# Patient Record
Sex: MALE | Race: WHITE | NOT HISPANIC OR LATINO | Employment: UNEMPLOYED | URBAN - METROPOLITAN AREA
[De-identification: names, ages, dates, MRNs, and addresses within clinical notes are randomized per-mention and may not be internally consistent; named-entity substitution may affect disease eponyms.]

---

## 2023-11-06 ENCOUNTER — OFFICE VISIT (OUTPATIENT)
Dept: PRIMARY CARE | Facility: CLINIC | Age: 38
End: 2023-11-06
Payer: COMMERCIAL

## 2023-11-06 VITALS
WEIGHT: 201 LBS | DIASTOLIC BLOOD PRESSURE: 80 MMHG | BODY MASS INDEX: 26.52 KG/M2 | TEMPERATURE: 98.2 F | HEART RATE: 86 BPM | SYSTOLIC BLOOD PRESSURE: 126 MMHG | OXYGEN SATURATION: 98 %

## 2023-11-06 DIAGNOSIS — J02.9 SORE THROAT: Primary | ICD-10-CM

## 2023-11-06 LAB — POC RAPID STREP: NEGATIVE

## 2023-11-06 PROCEDURE — 87880 STREP A ASSAY W/OPTIC: CPT | Performed by: NURSE PRACTITIONER

## 2023-11-06 PROCEDURE — 87081 CULTURE SCREEN ONLY: CPT

## 2023-11-06 PROCEDURE — 99213 OFFICE O/P EST LOW 20 MIN: CPT | Performed by: NURSE PRACTITIONER

## 2023-11-06 PROCEDURE — 1036F TOBACCO NON-USER: CPT | Performed by: NURSE PRACTITIONER

## 2023-11-06 RX ORDER — HYDROCODONE BITARTRATE AND ACETAMINOPHEN 5; 325 MG/1; MG/1
1 TABLET ORAL EVERY 4 HOURS PRN
COMMUNITY

## 2023-11-06 RX ORDER — GABAPENTIN 100 MG/1
100 CAPSULE ORAL 3 TIMES DAILY
COMMUNITY
Start: 2023-11-02 | End: 2023-12-02

## 2023-11-06 ASSESSMENT — PAIN SCALES - GENERAL: PAINLEVEL: 0-NO PAIN

## 2023-11-06 NOTE — PROGRESS NOTES
Subjective   Patient ID: Humphrey Ding is a 38 y.o. male who presents for Sore Throat (Left side worse. X2-3 days. Possible tonsil stone per patient.).    HPI   Pt presents with sore throat and possible tonsils stones that began  3 days ago  No painful or difficult swallow, drooling  No fever, chills,   No gi complaints  No otc used    Review of Systems  ROS negative with exceptions above    Objective   /80   Pulse 86   Temp 36.8 °C (98.2 °F)   Wt 91.2 kg (201 lb)   SpO2 98%   BMI 26.52 kg/m²     Physical Exam  A/O x3 NAD  Bilat TM dull. Nares Red and patent. +3 erythemic tonsils with white exudate left.  Posterior pharynx erythema noted.   Neck supple with bilateral AC lymphadenopathy  Heart with RRR  Lungs CTA bilaterally  Skin warm and dry  Neuro grossly intact     Assessment/Plan   Diagnoses and all orders for this visit:  Sore throat  -     POCT rapid strep A manually resulted  -     Group A Streptococcus, Culture  -     amoxicillin-pot clavulanate (Augmentin) 875-125 mg tablet; Take 1 tablet (875 mg) by mouth 2 times a day for 10 days.  Strep negative  Will treat with antibiotic for tonsillitis  Fluids  Rest  Salt water gargles  Humidifier  Follow up 7-10 days INB

## 2023-11-07 ENCOUNTER — TELEPHONE (OUTPATIENT)
Dept: PRIMARY CARE | Facility: CLINIC | Age: 38
End: 2023-11-07
Payer: COMMERCIAL

## 2023-11-07 RX ORDER — AMOXICILLIN AND CLAVULANATE POTASSIUM 875; 125 MG/1; MG/1
875 TABLET, FILM COATED ORAL 2 TIMES DAILY
Qty: 20 TABLET | Refills: 0 | Status: SHIPPED | OUTPATIENT
Start: 2023-11-07 | End: 2023-11-17

## 2023-11-07 NOTE — TELEPHONE ENCOUNTER
Pt says he needs antimitotic sent to pharmacy. Discussed in appt on 11/06  Pharmacy- [facility], [address]

## 2023-11-09 LAB — S PYO THROAT QL CULT: NORMAL

## 2023-11-15 ENCOUNTER — OFFICE VISIT (OUTPATIENT)
Dept: PRIMARY CARE | Facility: CLINIC | Age: 38
End: 2023-11-15
Payer: COMMERCIAL

## 2023-11-15 VITALS
SYSTOLIC BLOOD PRESSURE: 122 MMHG | HEART RATE: 68 BPM | OXYGEN SATURATION: 98 % | WEIGHT: 197 LBS | BODY MASS INDEX: 25.99 KG/M2 | TEMPERATURE: 97.8 F | DIASTOLIC BLOOD PRESSURE: 82 MMHG

## 2023-11-15 DIAGNOSIS — J03.90 TONSILLITIS: Primary | ICD-10-CM

## 2023-11-15 PROCEDURE — 1036F TOBACCO NON-USER: CPT | Performed by: NURSE PRACTITIONER

## 2023-11-15 PROCEDURE — 99213 OFFICE O/P EST LOW 20 MIN: CPT | Performed by: NURSE PRACTITIONER

## 2023-11-15 RX ORDER — AZITHROMYCIN 250 MG/1
TABLET, FILM COATED ORAL
Qty: 6 TABLET | Refills: 0 | Status: SHIPPED | OUTPATIENT
Start: 2023-11-15 | End: 2023-11-20

## 2023-11-15 ASSESSMENT — PAIN SCALES - GENERAL: PAINLEVEL: 0-NO PAIN

## 2023-11-15 NOTE — PATIENT INSTRUCTIONS
Please complete the previously prescribed antibiotic and add the Z-Ford    Salt water gargles  Stay hydrated  Monitor your symptoms and if not improving, we will send you to see ENT    To emergency department if unable to swallow, you are drooling, or feel swelling in your throat.

## 2023-11-16 NOTE — PROGRESS NOTES
Subjective   Patient ID: Humphrey Ding is a 38 y.o. male who presents for Sore Throat (Something was on uvula, fell off this morning. ).    HPI   Pt was recently seen for tonsillitis and was improving  Today he woke up feeling like something was in his throat.   He looked and saw his left tonsils covered in white pus  He tried to remove it with gargling but was unsuccessful. He used qtip to remove  Pt has mild sore throat, No drooling/gagging/fever. No change in speech. He is able to eat and drink without difficulty    Review of Systems  ROS negative with exceptions above    Objective   /82   Pulse 68   Temp 36.6 °C (97.8 °F)   Wt 89.4 kg (197 lb)   SpO2 98%   BMI 25.99 kg/m²     Physical Exam  Gen: A/O x3 NAD  Eyes: WNL  ENT: Bilat TM dull. Auditory canals wnl. Nares pink/patent. + 3 erythemic tonsils with white exudate to left tonsil. Uvula midline. No sinus tenderness. No drooling, Speech clear  Lungs: CTA bilaterally.  No wheeze. Speaks complete sentences/Unlabored.  Heart: RRR, no murmur  Neck: supple, no adenopathy  Skin: warm/dry, no rash  Neuro: grossly intact    Assessment/Plan   Diagnoses and all orders for this visit:  Tonsillitis  -     azithromycin (Zithromax) 250 mg tablet; Take 2 tablets (500 mg) by mouth once daily for 1 day, THEN 1 tablet (250 mg) once daily for 4 days. Take 2 tabs (500 mg) by mouth today, than 1 daily for 4 days..  Needs better control  Meds as directed  Fluids, salt water gargles  To ED for drooling, garbled speech  If not improving, ENT referral  Follow up 7-1 0 days INB

## 2024-01-15 ENCOUNTER — OFFICE VISIT (OUTPATIENT)
Dept: PRIMARY CARE | Facility: CLINIC | Age: 39
End: 2024-01-15
Payer: COMMERCIAL

## 2024-01-15 VITALS
OXYGEN SATURATION: 98 % | SYSTOLIC BLOOD PRESSURE: 124 MMHG | DIASTOLIC BLOOD PRESSURE: 80 MMHG | HEART RATE: 74 BPM | WEIGHT: 208 LBS | BODY MASS INDEX: 27.44 KG/M2

## 2024-01-15 DIAGNOSIS — F41.1 GAD (GENERALIZED ANXIETY DISORDER): Primary | ICD-10-CM

## 2024-01-15 PROCEDURE — 1036F TOBACCO NON-USER: CPT | Performed by: NURSE PRACTITIONER

## 2024-01-15 PROCEDURE — 99213 OFFICE O/P EST LOW 20 MIN: CPT | Performed by: NURSE PRACTITIONER

## 2024-01-15 RX ORDER — VENLAFAXINE HYDROCHLORIDE 37.5 MG/1
37.5 CAPSULE, EXTENDED RELEASE ORAL DAILY
Qty: 30 CAPSULE | Refills: 1 | Status: SHIPPED | OUTPATIENT
Start: 2024-01-15 | End: 2024-03-15

## 2024-01-15 ASSESSMENT — ANXIETY QUESTIONNAIRES
7. FEELING AFRAID AS IF SOMETHING AWFUL MIGHT HAPPEN: NOT AT ALL
2. NOT BEING ABLE TO STOP OR CONTROL WORRYING: NEARLY EVERY DAY
6. BECOMING EASILY ANNOYED OR IRRITABLE: SEVERAL DAYS
GAD7 TOTAL SCORE: 14
1. FEELING NERVOUS, ANXIOUS, OR ON EDGE: NEARLY EVERY DAY
3. WORRYING TOO MUCH ABOUT DIFFERENT THINGS: NEARLY EVERY DAY
4. TROUBLE RELAXING: SEVERAL DAYS
5. BEING SO RESTLESS THAT IT IS HARD TO SIT STILL: NEARLY EVERY DAY

## 2024-01-15 ASSESSMENT — PATIENT HEALTH QUESTIONNAIRE - PHQ9
2. FEELING DOWN, DEPRESSED OR HOPELESS: NOT AT ALL
1. LITTLE INTEREST OR PLEASURE IN DOING THINGS: NOT AT ALL
SUM OF ALL RESPONSES TO PHQ9 QUESTIONS 1 AND 2: 0

## 2024-01-15 ASSESSMENT — PAIN SCALES - GENERAL: PAINLEVEL: 0-NO PAIN

## 2024-01-15 NOTE — PROGRESS NOTES
Subjective   Patient ID: Humphrey Ding is a 38 y.o. male who presents for Anxiety (Follow up.).    HPI   Pt presents with anxiety  Pt is over thinking things, Feels on edge, impacting his day to day function  Has relationship stressors  Denies SI/HI  No hx of meds in the past    Review of Systems  Review of systems negative with exceptions above    Objective   /80   Pulse 74   Wt 94.3 kg (208 lb)   SpO2 98%   BMI 27.44 kg/m²     Physical Exam  Alert and oriented x 3, no acute distress  Breathing unlabored  Neuro grossly intact  Mood and affect appropriate.  Good insight and judgment    Assessment/Plan   Diagnoses and all orders for this visit:  TICO (generalized anxiety disorder)  -     venlafaxine XR (Effexor-XR) 37.5 mg 24 hr capsule; Take 1 capsule (37.5 mg) by mouth once daily. Do not crush or chew.  TICO-7 score elevated  Patient will start Effexor as prescribed  Med education provided  Work on relaxation/mindfulness  Calm darshan  Consider counseling.  Patient not receptive to therapy  Follow-up in 6 to 8 weeks for reevaluation  Other orders  -     Follow Up In Primary Care - Established; Future

## 2024-03-01 PROBLEM — S68.119A TRAUMATIC AMPUTATION OF FINGER: Status: ACTIVE | Noted: 2020-07-15

## 2024-03-01 PROBLEM — F41.1 GENERALIZED ANXIETY DISORDER: Status: ACTIVE | Noted: 2024-03-01

## 2024-03-01 PROBLEM — E66.3 OVERWEIGHT WITH BODY MASS INDEX (BMI) 25.0-29.9: Status: ACTIVE | Noted: 2020-07-13
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
## 2024-03-01 PROBLEM — S32.001A CLOSED BURST FRACTURE OF LUMBAR VERTEBRA (MULTI): Status: ACTIVE | Noted: 2022-09-30

## 2024-03-01 PROBLEM — R20.2 NUMBNESS AND TINGLING OF LEFT LEG: Status: ACTIVE | Noted: 2022-10-01

## 2024-03-01 PROBLEM — J03.90 TONSILLITIS: Status: ACTIVE | Noted: 2024-03-01

## 2024-03-01 PROBLEM — G89.11 ACUTE PAIN DUE TO TRAUMA: Status: RESOLVED | Noted: 2022-10-01 | Resolved: 2024-03-01

## 2024-03-01 PROBLEM — D69.6 THROMBOCYTOPENIA (CMS-HCC): Status: ACTIVE | Noted: 2022-10-03

## 2024-03-01 PROBLEM — D62 ACUTE BLOOD LOSS ANEMIA: Status: RESOLVED | Noted: 2022-10-03 | Resolved: 2024-03-01

## 2024-03-01 PROBLEM — J02.9 SORE THROAT: Status: RESOLVED | Noted: 2024-03-01 | Resolved: 2024-03-01

## 2024-03-01 PROBLEM — R20.0 NUMBNESS AND TINGLING OF LEFT LEG: Status: ACTIVE | Noted: 2022-10-01
